# Patient Record
Sex: MALE | Employment: FULL TIME | URBAN - METROPOLITAN AREA
[De-identification: names, ages, dates, MRNs, and addresses within clinical notes are randomized per-mention and may not be internally consistent; named-entity substitution may affect disease eponyms.]

---

## 2020-04-22 ENCOUNTER — HOSPITAL ENCOUNTER (OUTPATIENT)
Age: 70
Setting detail: OBSERVATION
Discharge: HOME | End: 2020-04-23
Attending: EMERGENCY MEDICINE | Admitting: INTERNAL MEDICINE
Payer: COMMERCIAL

## 2020-04-22 ENCOUNTER — APPOINTMENT (OUTPATIENT)
Dept: CT IMAGING | Age: 70
End: 2020-04-22
Attending: EMERGENCY MEDICINE
Payer: COMMERCIAL

## 2020-04-22 ENCOUNTER — APPOINTMENT (OUTPATIENT)
Dept: NON INVASIVE DIAGNOSTICS | Age: 70
End: 2020-04-22
Attending: INTERNAL MEDICINE
Payer: COMMERCIAL

## 2020-04-22 ENCOUNTER — HOSPITAL ENCOUNTER (OUTPATIENT)
Dept: MRI IMAGING | Age: 70
Discharge: HOME | End: 2020-04-22
Attending: INTERNAL MEDICINE
Payer: COMMERCIAL

## 2020-04-22 DIAGNOSIS — M48.061 SPINAL STENOSIS OF LUMBAR REGION, UNSPECIFIED WHETHER NEUROGENIC CLAUDICATION PRESENT: ICD-10-CM

## 2020-04-22 DIAGNOSIS — R29.898 TRANSIENT LEFT LEG WEAKNESS: ICD-10-CM

## 2020-04-22 DIAGNOSIS — R20.2 NUMBNESS AND TINGLING OF LEFT LEG: ICD-10-CM

## 2020-04-22 DIAGNOSIS — R20.0 NUMBNESS AND TINGLING OF LEFT LEG: ICD-10-CM

## 2020-04-22 DIAGNOSIS — R09.89 SUSPECTED STROKE PATIENT LAST KNOWN TO BE WELL MORE THAN 2 HOURS AGO: Primary | ICD-10-CM

## 2020-04-22 PROBLEM — R07.9 CHEST PAIN: Status: ACTIVE | Noted: 2020-04-22

## 2020-04-22 PROBLEM — G45.9 TIA (TRANSIENT ISCHEMIC ATTACK): Status: RESOLVED | Noted: 2020-04-22 | Resolved: 2020-04-22

## 2020-04-22 PROBLEM — I63.9 CVA (CEREBRAL VASCULAR ACCIDENT) (HCC): Status: ACTIVE | Noted: 2020-04-22

## 2020-04-22 PROBLEM — G45.9 TIA (TRANSIENT ISCHEMIC ATTACK): Status: ACTIVE | Noted: 2020-04-22

## 2020-04-22 PROBLEM — E78.5 HYPERLIPIDEMIA: Status: ACTIVE | Noted: 2020-04-22

## 2020-04-22 PROBLEM — C61 PROSTATE CANCER (HCC): Status: ACTIVE | Noted: 2020-04-22

## 2020-04-22 LAB
ALBUMIN SERPL-MCNC: 3.9 G/DL (ref 3.5–5)
ALBUMIN/GLOB SERPL: 1.4 {RATIO} (ref 1.1–2.2)
ALP SERPL-CCNC: 59 U/L (ref 45–117)
ALT SERPL-CCNC: 27 U/L (ref 12–78)
ANION GAP SERPL CALC-SCNC: 11 MMOL/L (ref 5–15)
AST SERPL-CCNC: 17 U/L (ref 15–37)
AV VELOCITY RATIO: 0.95
BASOPHILS # BLD: 0 K/UL (ref 0–0.1)
BASOPHILS NFR BLD: 1 % (ref 0–1)
BILIRUB SERPL-MCNC: 0.6 MG/DL (ref 0.2–1)
BUN SERPL-MCNC: 21 MG/DL (ref 6–20)
BUN/CREAT SERPL: 17 (ref 12–20)
CALCIUM SERPL-MCNC: 9.1 MG/DL (ref 8.5–10.1)
CHLORIDE SERPL-SCNC: 107 MMOL/L (ref 97–108)
CO2 SERPL-SCNC: 27 MMOL/L (ref 21–32)
CREAT SERPL-MCNC: 1.24 MG/DL (ref 0.7–1.3)
DIFFERENTIAL METHOD BLD: ABNORMAL
ECHO AO ROOT DIAM: 3.5 CM
ECHO AV AREA PEAK VELOCITY: 3.2 CM2
ECHO AV AREA/BSA PEAK VELOCITY: 1.5 CM2/M2
ECHO AV PEAK GRADIENT: 5.9 MMHG
ECHO AV PEAK VELOCITY: 121.14 CM/S
ECHO EST RA PRESSURE: 3 MMHG
ECHO LA VOL 2C: 70.78 ML (ref 18–58)
ECHO LA VOL 4C: 42.34 ML (ref 18–58)
ECHO LA VOL BP: 57.12 ML (ref 18–58)
ECHO LA VOL/BSA BIPLANE: 27.37 ML/M2 (ref 16–28)
ECHO LA VOLUME INDEX A2C: 33.91 ML/M2 (ref 16–28)
ECHO LA VOLUME INDEX A4C: 20.29 ML/M2 (ref 16–28)
ECHO LVOT DIAM: 2.06 CM
ECHO LVOT PEAK GRADIENT: 5.3 MMHG
ECHO LVOT PEAK VELOCITY: 114.63 CM/S
ECHO MV A VELOCITY: 50.38 CM/S
ECHO MV E DECELERATION TIME (DT): 179.4 MS
ECHO MV E VELOCITY: 62.82 CM/S
ECHO MV E/A RATIO: 1.25
ECHO PV MAX VELOCITY: 84.59 CM/S
ECHO PV PEAK GRADIENT: 2.9 MMHG
ECHO RV INTERNAL DIMENSION: 3.86 CM
EOSINOPHIL # BLD: 0.1 K/UL (ref 0–0.4)
EOSINOPHIL NFR BLD: 1 % (ref 0–7)
ERYTHROCYTE [DISTWIDTH] IN BLOOD BY AUTOMATED COUNT: 12.6 % (ref 11.5–14.5)
GLOBULIN SER CALC-MCNC: 2.8 G/DL (ref 2–4)
GLUCOSE BLD STRIP.AUTO-MCNC: 146 MG/DL (ref 65–100)
GLUCOSE SERPL-MCNC: 112 MG/DL (ref 65–100)
HCT VFR BLD AUTO: 44 % (ref 36.6–50.3)
HGB BLD-MCNC: 14.3 G/DL (ref 12.1–17)
IMM GRANULOCYTES # BLD AUTO: 0 K/UL (ref 0–0.04)
IMM GRANULOCYTES NFR BLD AUTO: 1 % (ref 0–0.5)
INR PPP: 1 (ref 0.9–1.1)
LYMPHOCYTES # BLD: 1 K/UL (ref 0.8–3.5)
LYMPHOCYTES NFR BLD: 23 % (ref 12–49)
MCH RBC QN AUTO: 29.7 PG (ref 26–34)
MCHC RBC AUTO-ENTMCNC: 32.5 G/DL (ref 30–36.5)
MCV RBC AUTO: 91.3 FL (ref 80–99)
MONOCYTES # BLD: 0.4 K/UL (ref 0–1)
MONOCYTES NFR BLD: 8 % (ref 5–13)
MV DEC SLOPE: 3.5
NEUTS SEG # BLD: 3 K/UL (ref 1.8–8)
NEUTS SEG NFR BLD: 67 % (ref 32–75)
NRBC # BLD: 0 K/UL (ref 0–0.01)
NRBC BLD-RTO: 0 PER 100 WBC
PLATELET # BLD AUTO: 194 K/UL (ref 150–400)
PMV BLD AUTO: 10.6 FL (ref 8.9–12.9)
POTASSIUM SERPL-SCNC: 4.4 MMOL/L (ref 3.5–5.1)
PROT SERPL-MCNC: 6.7 G/DL (ref 6.4–8.2)
PROTHROMBIN TIME: 10.2 SEC (ref 9–11.1)
RBC # BLD AUTO: 4.82 M/UL (ref 4.1–5.7)
SERVICE CMNT-IMP: ABNORMAL
SODIUM SERPL-SCNC: 145 MMOL/L (ref 136–145)
WBC # BLD AUTO: 4.4 K/UL (ref 4.1–11.1)

## 2020-04-22 PROCEDURE — 93306 TTE W/DOPPLER COMPLETE: CPT

## 2020-04-22 PROCEDURE — 85025 COMPLETE CBC W/AUTO DIFF WBC: CPT

## 2020-04-22 PROCEDURE — 70551 MRI BRAIN STEM W/O DYE: CPT

## 2020-04-22 PROCEDURE — 85610 PROTHROMBIN TIME: CPT

## 2020-04-22 PROCEDURE — 74011636320 HC RX REV CODE- 636/320: Performed by: EMERGENCY MEDICINE

## 2020-04-22 PROCEDURE — 93005 ELECTROCARDIOGRAM TRACING: CPT

## 2020-04-22 PROCEDURE — 74011250637 HC RX REV CODE- 250/637: Performed by: EMERGENCY MEDICINE

## 2020-04-22 PROCEDURE — 94762 N-INVAS EAR/PLS OXIMTRY CONT: CPT

## 2020-04-22 PROCEDURE — 65270000029 HC RM PRIVATE

## 2020-04-22 PROCEDURE — 36415 COLL VENOUS BLD VENIPUNCTURE: CPT

## 2020-04-22 PROCEDURE — 99218 HC RM OBSERVATION: CPT

## 2020-04-22 PROCEDURE — 70496 CT ANGIOGRAPHY HEAD: CPT

## 2020-04-22 PROCEDURE — 65660000000 HC RM CCU STEPDOWN

## 2020-04-22 PROCEDURE — 72131 CT LUMBAR SPINE W/O DYE: CPT

## 2020-04-22 PROCEDURE — 96372 THER/PROPH/DIAG INJ SC/IM: CPT

## 2020-04-22 PROCEDURE — 99285 EMERGENCY DEPT VISIT HI MDM: CPT

## 2020-04-22 PROCEDURE — 74011250636 HC RX REV CODE- 250/636: Performed by: INTERNAL MEDICINE

## 2020-04-22 PROCEDURE — 80053 COMPREHEN METABOLIC PANEL: CPT

## 2020-04-22 PROCEDURE — 70450 CT HEAD/BRAIN W/O DYE: CPT

## 2020-04-22 PROCEDURE — 82962 GLUCOSE BLOOD TEST: CPT

## 2020-04-22 PROCEDURE — 74011250636 HC RX REV CODE- 250/636: Performed by: EMERGENCY MEDICINE

## 2020-04-22 RX ORDER — ASPIRIN 325 MG
325 TABLET ORAL DAILY
Status: DISCONTINUED | OUTPATIENT
Start: 2020-04-23 | End: 2020-04-22

## 2020-04-22 RX ORDER — SODIUM CHLORIDE 0.9 % (FLUSH) 0.9 %
5-40 SYRINGE (ML) INJECTION EVERY 8 HOURS
Status: DISCONTINUED | OUTPATIENT
Start: 2020-04-22 | End: 2020-04-23 | Stop reason: HOSPADM

## 2020-04-22 RX ORDER — ACETAMINOPHEN 650 MG/1
650 SUPPOSITORY RECTAL
Status: DISCONTINUED | OUTPATIENT
Start: 2020-04-22 | End: 2020-04-23 | Stop reason: HOSPADM

## 2020-04-22 RX ORDER — ENOXAPARIN SODIUM 100 MG/ML
40 INJECTION SUBCUTANEOUS EVERY 24 HOURS
Status: DISCONTINUED | OUTPATIENT
Start: 2020-04-22 | End: 2020-04-22

## 2020-04-22 RX ORDER — SODIUM CHLORIDE 0.9 % (FLUSH) 0.9 %
5-40 SYRINGE (ML) INJECTION AS NEEDED
Status: DISCONTINUED | OUTPATIENT
Start: 2020-04-22 | End: 2020-04-23 | Stop reason: HOSPADM

## 2020-04-22 RX ORDER — GUAIFENESIN 100 MG/5ML
81 LIQUID (ML) ORAL DAILY
Status: DISCONTINUED | OUTPATIENT
Start: 2020-04-23 | End: 2020-04-22

## 2020-04-22 RX ORDER — CLOPIDOGREL BISULFATE 75 MG/1
75 TABLET ORAL
Status: COMPLETED | OUTPATIENT
Start: 2020-04-22 | End: 2020-04-22

## 2020-04-22 RX ORDER — ACETAMINOPHEN 325 MG/1
650 TABLET ORAL
Status: DISCONTINUED | OUTPATIENT
Start: 2020-04-22 | End: 2020-04-23 | Stop reason: HOSPADM

## 2020-04-22 RX ORDER — CLOPIDOGREL BISULFATE 75 MG/1
75 TABLET ORAL DAILY
Status: DISCONTINUED | OUTPATIENT
Start: 2020-04-23 | End: 2020-04-23 | Stop reason: HOSPADM

## 2020-04-22 RX ORDER — GUAIFENESIN 100 MG/5ML
81 LIQUID (ML) ORAL DAILY
Status: DISCONTINUED | OUTPATIENT
Start: 2020-04-23 | End: 2020-04-23 | Stop reason: HOSPADM

## 2020-04-22 RX ORDER — GUAIFENESIN 100 MG/5ML
81 LIQUID (ML) ORAL DAILY
COMMUNITY

## 2020-04-22 RX ORDER — ENOXAPARIN SODIUM 100 MG/ML
40 INJECTION SUBCUTANEOUS EVERY 24 HOURS
Status: DISCONTINUED | OUTPATIENT
Start: 2020-04-22 | End: 2020-04-23 | Stop reason: HOSPADM

## 2020-04-22 RX ORDER — ASPIRIN 325 MG
325 TABLET ORAL
Status: COMPLETED | OUTPATIENT
Start: 2020-04-22 | End: 2020-04-22

## 2020-04-22 RX ORDER — FENOFIBRATE 160 MG/1
160 TABLET ORAL DAILY
COMMUNITY

## 2020-04-22 RX ORDER — TADALAFIL 5 MG/1
5 TABLET ORAL
COMMUNITY

## 2020-04-22 RX ORDER — ATORVASTATIN CALCIUM 20 MG/1
40 TABLET, FILM COATED ORAL DAILY
Status: DISCONTINUED | OUTPATIENT
Start: 2020-04-23 | End: 2020-04-23 | Stop reason: HOSPADM

## 2020-04-22 RX ADMIN — CLOPIDOGREL BISULFATE 75 MG: 75 TABLET ORAL at 11:16

## 2020-04-22 RX ADMIN — Medication 10 ML: at 21:36

## 2020-04-22 RX ADMIN — IOPAMIDOL 100 ML: 755 INJECTION, SOLUTION INTRAVENOUS at 10:29

## 2020-04-22 RX ADMIN — Medication 20 ML: at 14:00

## 2020-04-22 RX ADMIN — ENOXAPARIN SODIUM 40 MG: 40 INJECTION SUBCUTANEOUS at 21:35

## 2020-04-22 RX ADMIN — SODIUM CHLORIDE 1000 ML: 900 INJECTION, SOLUTION INTRAVENOUS at 11:16

## 2020-04-22 RX ADMIN — ASPIRIN 325 MG: 325 TABLET ORAL at 11:16

## 2020-04-22 NOTE — ED NOTES
TRANSFER - OUT REPORT:    Verbal report given to Marbella Agrawal RN(name) on [facility]  [facility] for routine [facility] of care       Report consisted of patients Situation, Background, Assessment and   Recommendations(SBAR). Information from the following report(s) SBAR, ED Summary and MAR and test results was reviewed with the receiving nurse. Lines:   Peripheral IV 04/22/20 Left Antecubital (Active)   Site Assessment Clean, dry, & intact 4/22/2020 10:45 AM   Phlebitis Assessment 0 4/22/2020 10:45 AM   Infiltration Assessment 0 4/22/2020 10:45 AM   Dressing Status Clean, dry, & intact 4/22/2020 10:45 AM   Dressing Type Transparent 4/22/2020 10:45 AM   Hub Color/Line Status Pink 4/22/2020 10:45 AM   Action Taken Dressing reinforced 4/22/2020 10:45 AM   Alcohol Cap Used Yes 4/22/2020 10:45 AM        Opportunity for questions and clarification was provided.       Patient transported with:   iv saline lock

## 2020-04-22 NOTE — CONSULTS
[facility]: [id number] [address] Neurology  [place] [address]  [phone]      Name:   Sesar Arellano   Medical record #: [id number]  Admission Date: 4/22/2020     Who Consulted: Dr. Tay Levine    Reason for Consult:  Eval and treat TIA    HISTORY OF PRESENT ILLNESS:     This is a 77 y.o. male who is admitted for leg weakness. Mr. Reina Nelson presented to the ED with left leg weakness and tingling. He woke up to use the restroom overnight and noted that he had to exert more effort to lift his leg off the bed, he was able to ambulate the short distance to the bathroom where he urinated without difficulty. However on return he felt like his leg was weaker than normal, and numb, and he felt his left leg give out from his weight and caused him to fall backwards, landing on his rear end in his bedroom. He was then able to get up and went back to bed. He then awoke again around in the early am and noted persistent left lower extremity numbness and weakness that reportedly gradually resolved. He reported to the ED after his symptoms continued to return intermittently for a few seconds when he walked. Upon admission to the ED he was noted to have tenderness in his left lumbar area. At lumbar CT showed moderate to severe canal stenosis at multiple levels. The Neurology Service is asked to evaluate for stroke. Upon interview he reported that he has been having some numbness in his left calf for the last few months. Since admission he has had a few additional episodes that occur when he stands. Neuro-imaging:     CT Head: No acute intracranial bleed. 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 CTA NECK  No pulmonary mass or nodule. No pneumothorax.  The bilateral subclavian, common carotid, and internal carotid arteries are patent with no flow-limiting stenosis.     % of right carotid artery stenosis: 0  % of left carotid artery stenosis: 0     NASCET method was utilized for calculating stenosis.      Multilevel canal and foraminal stenosis of the cervical spine. Severe foraminal stenoses. The cervical soft tissues are unremarkable. There are degenerative changes of the cervical spine.     CTA HEAD  The vertebral basilar system is diminutive in size. M1 segments are patent. M2 segments demonstrate symmetric arborization. Azygous A2. Dural venous sinuses are patent. Petrous and cavernous ICAs are patent. Posterior communicating artery on the right. P1 and P2 segments are patent. . The basilar artery and its branches are normal. The internal carotid, anterior cerebral, and middle cerebral arteries are patent. There is no flow-limiting intracranial stenosis. There is no aneurysm. There are no sizable posterior communicating arteries.     IMPRESSION:   No aneurysm, dissection or or evidence of hemodynamically significant stenosis. No acute intracranial process identified. No major vessel occlusion. EKG: normal sinus rhythm. Care Plan discussed with:  Patient x   Family    RN    Care Manager    Consultant/Specialist:         Thank you for allowing the Neurology Service the pleasure of participating in the care of your patient. This patient will be discussed with my collaborating care team physician,  Dr. Abbie Guzmán, and he may have further recommendations regarding this patient's care      Fabienne Lancaster, [facility]  ====================      Attending Attestation:       NEUROLOGY NOTE ADDENDUM:    4/23/2020      I have reviewed the documentation provided by the nurse practitioner, discussed her findings, clinical impression, and the proposed management plans with regards to this patient's encounter. I have personally performed a face to face diagnostic evaluation on this patient. My findings are as follows:      Mac Ma is a 77 y.o. male who  has a past medical history of Cancer ([facility] [address]  who presents for evaluation of L lower extremity weakness. (+) lower back pain.      Exam:  Visit Vitals  /63 (BP 1 Location: Left arm, BP Patient Position: At rest)   Pulse (!) 48   Temp 98.1 °F (36.7 °C)   Resp 18   Ht 5' 10\" (1.778 m)   Wt 90.7 kg (199 lb 15.3 oz)   SpO2 96%   BMI 28.69 kg/m²     Gen: Awake, alert, follows commands  Appropriate appearance, normal speech. Oriented to all spheres. No visual field defect on confrontation exam.  Full eyes movement, with no nystagmus, no diplopia, no ptosis. Normal gag and swallow. All remaining cranial nerves were normal  Motor function: 5/5 in all extremities  Sensory: intact to LT, PP and JPS  DTRs + in all extremities, (-) Babinski  Good FTN and HTS   Gait: Deferred      Assessment  Intermittent L lower leg weakness, more likely related to mod to severe L2L3 spinal stenosis  MRI brain negative for acute stroke  CT head reported possible stroke, I think is more artifactual in nature due to asymmetric imaging    Plan  1) Needs neurosurgical evaluation which can be done as out patient  2) Physical therapy        Thank you for the consultation. Endy Okeefe MD  Diplomate, American Board of Psychiatry and Neurology  Diplomate, Neuromuscular Medicine  Diplomate, American Board of Electrodiagnostic Medicine                       Assessment/Plan:     1. LLE weakness:    · Continue home ASA 81 mg  · Neurochecks:  Every 4 hours  · Outpt follow up with neurosurgery for lumbar stenosis    Stroke risk stratification:    · A1C:  · LDL:    · TTE:  Estimated left ventricular ejection fraction is 55 - 60%. No regional wall motion abnormality noted. · MRI:  No evidence of intracranial mass, hemorrhage or acute infarction. 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 Mobility:   · Has been OOB. · [facility] to [facility]    3.   Diet: · Does not need SLP, passed STAND    4. VTE Prophylaxes:   · Per Primary team           Review of Systems: 10 point ROS was performed. Pertinent positives listed in HPI. Negative ROS is as follows. Pt denies: angina, palpitations, vision loss, slurred speech, aphasia, confusion, fever, chills, falls, headache, diplopia, back pain, neck pain, prior episodes of vertigo, hallucinations, new medications or dosage changes. Physical Exam    General:   Alert, cooperative, no acute distress. Lungs:   Clear to auscultation bilaterally. No crackles/wheezes. Heart:  Abdominal:  Normal rhythm, no carotid bruits, no peripheral edema  Soft and nondistended   NEUROLOGICAL EXAM:     Mental Status: Oriented to time, place and person. Fully attentive. No aphasia. Full fund of knowledge. Normal recent and remote memory. Cranial Nerves:   Visual Fields:  normal in all quadrants in both eyes. EOM: no nystagmus. Facial movements:  symmetric, no ptosis Facial sensation:  intact to LT on both sides. Hearing:  normal.       Language:  no dysarthria, no aphasia, normal fluency, normal repetition. Tongue: midline. Soft palate: not examined  SCMs: normal, symmetric. Reflexes:   LUExt: 2+/ 4                 RUExt: 2+/ 4  LLExt: 2+/4                  RLExt: 2+/ 4          Sensory:   LT and Temp intact in all extremities            Cerebellar:  No resting, no postural tremors, normal finger nose finger. No pronator drift                            Motor:           LUExt: 5/ 5               RUExt: 5/ 5                                              LLExt: 5/ 5                RLExt: 5/ 5        Gait:   Not tested              Allergies:   No Known Allergies    Outpatient Meds  No current facility-administered medications on file prior to encounter. Current Outpatient Medications on File Prior to Encounter   Medication Sig Dispense Refill    aspirin 81 mg chewable tablet Take 81 mg by mouth daily.       fenofibrate (LOFIBRA) 160 mg tablet Take 160 mg by mouth daily.  tadalafiL (Cialis) 5 mg tablet Take 5 mg by mouth. Inpatient Meds    Current Facility-Administered Medications   Medication Dose Route Frequency Provider Last Rate Last Dose    sodium chloride (NS) flush 5-40 mL  5-40 mL IntraVENous Q8H Abran Silver MD   20 mL at 04/22/20 1400    sodium chloride (NS) flush 5-40 mL  5-40 mL IntraVENous PRN Tamica Silver MD        enoxaparin (LOVENOX) injection 40 mg  40 mg SubCUTAneous Q24H Tamica Silver MD        acetaminophen (TYLENOL) tablet 650 mg  650 mg Oral Q4H PRN Tamica Silver MD        Or   Mahin Losevinod acetaminophen (TYLENOL) solution 650 mg  650 mg Per NG tube Q4H PRN Tamica Silver MD        Or   Mahin Losevinod acetaminophen (TYLENOL) suppository 650 mg  650 mg Rectal Q4H PRN Haider Hernandez MD        [START ON 4/23/2020] atorvastatin (LIPITOR) tablet 40 mg  40 mg Oral DAILY Tamica Silver MD        [START ON 4/23/2020] aspirin tablet 325 mg  325 mg Oral DAILY Tamica Silver MD               Past Medical History:   Diagnosis Date    Cancer [facility])        History reviewed. No pertinent surgical history. family history is not on file. reports that he has never smoked. He has never used smokeless tobacco. He reports current alcohol use.            Lab Results (last 24 hrs)  Recent Results (from the past 24 hour(s))   GLUCOSE, POC    Collection Time: 04/22/20 10:02 AM   Result Value Ref Range    Glucose (POC) 146 (H) 65 - 100 mg/dL    Performed by Mauro Theodore    CBC WITH AUTOMATED DIFF    Collection Time: 04/22/20 10:41 AM   Result Value Ref Range    WBC 4.4 4.1 - 11.1 K/uL    RBC 4.82 4.10 - 5.70 M/uL    HGB 14.3 12.1 - 17.0 g/dL    HCT 44.0 36.6 - 50.3 %    MCV 91.3 80.0 - 99.0 FL    MCH 29.7 26.0 - 34.0 PG    MCHC 32.5 30.0 - 36.5 g/dL    RDW 12.6 11.5 - 14.5 %    PLATELET 230 936 - 196 K/uL    MPV 10.6 8.9 - 12.9 FL    NRBC 0.0 0.0  WBC    ABSOLUTE NRBC 0.00 0.00 - 0.01 K/uL    NEUTROPHILS 67 32 - 75 %    LYMPHOCYTES 23 12 - 49 %    MONOCYTES 8 5 - 13 %    EOSINOPHILS 1 0 - 7 %    BASOPHILS 1 0 - 1 %    IMMATURE GRANULOCYTES 1 (H) 0 - 0.5 %    ABS. NEUTROPHILS 3.0 1.8 - 8.0 K/UL    ABS. LYMPHOCYTES 1.0 0.8 - 3.5 K/UL    ABS. MONOCYTES 0.4 0.0 - 1.0 K/UL    ABS. EOSINOPHILS 0.1 0.0 - 0.4 K/UL    ABS. BASOPHILS 0.0 0.0 - 0.1 K/UL    ABS. IMM. GRANS. 0.0 0.00 - 0.04 K/UL    DF AUTOMATED     METABOLIC PANEL, COMPREHENSIVE    Collection Time: 04/22/20 10:41 AM   Result Value Ref Range    Sodium 145 136 - 145 mmol/L    Potassium 4.4 3.5 - 5.1 mmol/L    Chloride 107 97 - 108 mmol/L    CO2 27 21 - 32 mmol/L    Anion gap 11 5 - 15 mmol/L    Glucose 112 (H) 65 - 100 mg/dL    BUN 21 (H) 6 - 20 MG/DL    Creatinine 1.24 0.70 - 1.30 MG/DL    BUN/Creatinine ratio 17 12 - 20      GFR est AA >60 >60 ml/min/1.73m2    GFR est non-AA 58 (L) >60 ml/min/1.73m2    Calcium 9.1 8.5 - 10.1 MG/DL    Bilirubin, total 0.6 0.2 - 1.0 MG/DL    ALT (SGPT) 27 12 - 78 U/L    AST (SGOT) 17 15 - 37 U/L    Alk.  phosphatase 59 45 - 117 U/L    Protein, total 6.7 6.4 - 8.2 g/dL    Albumin 3.9 3.5 - 5.0 g/dL    Globulin 2.8 2.0 - 4.0 g/dL    A-G Ratio 1.4 1.1 - 2.2     PROTHROMBIN TIME + INR    Collection Time: 04/22/20 10:41 AM   Result Value Ref Range    INR 1.0 0.9 - 1.1      Prothrombin time 10.2 9.0 - 11.1 sec   EKG, 12 LEAD, INITIAL    Collection Time: 04/22/20 10:54 AM   Result Value Ref Range    Ventricular Rate 57 BPM    Atrial Rate 57 BPM    P-R Interval 180 ms    QRS Duration 70 ms    Q-T Interval 430 ms    QTC Calculation (Bezet) 418 ms    Calculated P Axis -10 degrees    Calculated R Axis -7 degrees    Calculated T Axis 2 degrees    Diagnosis       Sinus bradycardia  Minimal voltage criteria for LVH, may be normal variant  Borderline ECG  No previous ECGs available     ECHO ADULT COMPLETE    Collection Time: 04/22/20  2:56 PM   Result Value Ref Range    LA Volume 57.12 18 - 58 mL    Ao Root D 3.50 cm    Aortic Valve Systolic Peak Velocity 107.04 cm/s    Aortic Valve Area by Continuity of Peak Velocity 3.2 cm2    AoV PG 5.9 mmHg    LVOT d 2.06 cm    LVOT Peak Velocity 114.63 cm/s    LVOT Peak Gradient 5.3 mmHg    MV A Tim 50.38 cm/s    MV E Tim 62.82 cm/s    MV E/A 1.25     RVIDd 3.86 cm    LA Vol 4C 42.34 18 - 58 mL    LA Vol 2C 70.78 (A) 18 - 58 mL    Est. RA Pressure 3.0 mmHg    Mitral Valve E Wave Deceleration Time 179.4 ms    Pulmonic Valve Max Velocity 84.59 cm/s    LA Vol Index 27.37 16 - 28 ml/m2    LA Vol Index 33.91 16 - 28 ml/m2    LA Vol Index 20.29 16 - 28 ml/m2    ADAN/BSA Pk Tim 1.5 cm2/m2    Mitral Valve Deceleration [place] [id number]     AV Velocity Ratio 0.95     PV peak gradient 2.9 mmHg

## 2020-04-22 NOTE — ED NOTES
Patient notified to remain NPO until further notice from provider, all questions answered and patient confirmed understanding.

## 2020-04-22 NOTE — ED PROVIDER NOTES
77 y.o. male with history of HLD, and minor prostate cancer being observed, on 81mg aspirin, presents to the ED stating that this AM he awoke with left leg numbness and tingling with associated left leg weakness at 0430 when he awoke to use to the bathroom. He states that he had to exert more effort to lift his leg off the bed and he was able to ambulate the short distance to the bathroom where he urinated without difficulty, but on return he felt like his leg was weaker than normal, and numb, and he felt his left leg give out from his weight and caused him to fall backwards, landing on his rear end in his bedroom. He was then able to get up and went back to bed. He then awoke again around 6:30am when he noted persistent left lower extremity numbness and weakness that reportedly gradually resolved. However, his symptoms have continued to return intermittently as he notes them when he walks intermittently. He states that the returned symptoms only seem to last for a few seconds and then resolve. He denies any symptoms currently, was seen to ambulate without difficulty into the ED. He denies any history of prior CVA, or sciatica sx. No history of similar symptoms like this. He took his normal ASA this AM, no other medications for his sx. Past Medical History:   Diagnosis Date    Cancer [facility])        History reviewed. No pertinent surgical history. History reviewed. No pertinent family history.     Social History     Socioeconomic History    Marital status:      Spouse name: Not on file    Number of children: Not on file    Years of education: Not on file    Highest education level: Not on file   Occupational History    Not on file   Social Needs    Financial resource strain: Not on file    Food insecurity     Worry: Not on file     Inability: Not on file    Transportation needs     Medical: Not on file     Non-medical: Not on file   Tobacco Use    Smoking status: Never Smoker    Smokeless tobacco: Never Used   Substance and Sexual Activity    Alcohol use: Yes    Drug use: Not on file    Sexual activity: Not on file   Lifestyle    Physical activity     Days per week: Not on file     Minutes per session: Not on file    Stress: Not on file   Relationships    Social connections     Talks on phone: Not on file     Gets together: Not on file     Attends Hindu service: Not on file     Active member of club or organization: Not on file     Attends meetings of clubs or organizations: Not on file     Relationship status: Not on file    Intimate partner violence     Fear of current or ex partner: Not on file     Emotionally abused: Not on file     Physically abused: Not on file     Forced sexual activity: Not on file   Other Topics Concern    Not on file   Social History Narrative    Not on file         ALLERGIES: Patient has no known allergies. Review of Systems   Constitutional: Positive for activity change. Negative for appetite change, chills and fever. HENT: Negative for congestion, rhinorrhea, sinus pain, sneezing and sore throat. Eyes: Negative for photophobia and visual disturbance. Respiratory: Negative for cough and shortness of breath. Cardiovascular: Negative for chest pain. Gastrointestinal: Negative for abdominal pain, blood in stool, constipation, diarrhea, nausea and vomiting. Genitourinary: Negative for difficulty urinating, dysuria, flank pain, hematuria, penile pain and testicular pain. Musculoskeletal: Negative for arthralgias, back pain, myalgias and neck pain. Skin: Negative for rash and wound. Neurological: Positive for weakness and numbness. Negative for syncope, light-headedness and headaches. Psychiatric/Behavioral: Negative for self-injury and suicidal ideas. All other systems reviewed and are negative.       Vitals:    04/22/20 0946 04/22/20 0953   BP: 123/73    Pulse: 62    Resp: 16    Temp: 98.5 °F (36.9 °C)    SpO2: 97%    Weight: 90.7 kg (200 lb)   Height:  5' 10\" (1.778 m)            Physical Exam  Vitals signs and nursing note reviewed. Constitutional:       General: He is not in acute distress. Appearance: Normal appearance. He is well-developed. He is not diaphoretic. HENT:      Head: Normocephalic and atraumatic. Nose: Nose normal.   Eyes:      Extraocular Movements: Extraocular movements intact. Conjunctiva/sclera: Conjunctivae normal.      Pupils: Pupils are equal, round, and reactive to light. Neck:      Musculoskeletal: Neck supple. Cardiovascular:      Rate and Rhythm: Normal rate and regular rhythm. Heart sounds: Normal heart sounds. Pulmonary:      Effort: Pulmonary effort is normal.      Breath sounds: Normal breath sounds. Abdominal:      General: There is no distension. Palpations: Abdomen is soft. Tenderness: There is no abdominal tenderness. Musculoskeletal:      Lumbar back: He exhibits tenderness. He exhibits no bony tenderness, no swelling, no deformity, no spasm and normal pulse. Back:       Comments: Negative SLR b/l. Skin:     General: Skin is warm and dry. Neurological:      General: No focal deficit present. Mental Status: He is alert and oriented to person, place, and time. Cranial Nerves: No cranial nerve deficit. Sensory: No sensory deficit. Motor: No weakness. Coordination: Coordination normal.      Comments: Intact sensation, 5/5 strength in all 4 extremities, intact finger to nose, neg pronator drift, fluent speech, CN intact. No saddle anesthesia. MDM    77 y.o. male awoke with left leg weakness and numbness that then reportedly resolved, but then seems to return intermittently when ambulating. On exam he is neuro intact, as above. Concern for CVA, vs radiculopathy/sciatica, but no associated pain, non-reproducible on exam with SLR, but very mild left lower back muscle tenderness.  No evidence of trauma or midline tenderness. Code-s level 2 was called. CT WO and CTA head and neck were ordered per protocol and CT Lumbar was also done to further evaluate for bony abnormality given possibility of peripheral nerve issue. CT head WO shows no acute bleed or infarct, but does show age-indeterminate left external capsule infarct. CTA head and neck negative for acute abnormality    CT lumbar shows moderate to severe canal stenosis at multiple levels. Labs returned showing no significant abnormalities. Possible peripheral radiculopathy/sciatica, vs CVA/TIA. May only be noting sx while walking because it is causing his sx or because he only notices the deficits when walking. Procedures    10:10 AM discussed case with Dr. Jj Odom, tele-neurology, who agreed to see the patient at the bedside. 1054 EKG shows sinus bradycardia with a rate of 57 bpm with no acute ST or T wave abnormalities suggestive of ischemia. 11 AM further discussed case with Dr. Jj Odom, who recommends giving IV fluid bolus, full-strength aspirin, Plavix, and recommends admission for CVA work-up. Marineist Ehsan Serve for Admission  11:24 AM    ED Room Number: WER03/03  Patient Name and age:  Adair Treviño 77 y.o.  male  Working Diagnosis:   1. Suspected stroke patient last known to be well more than 2 hours ago    2. Numbness and tingling of left leg    3. Transient left leg weakness    4. Spinal stenosis of lumbar region, unspecified whether neurogenic claudication present        COVID-19 Suspicion:  no    Readmission: no  Isolation Requirements:  no  Recommended Level of Care:  med/surg  Department:[facility] - [phone]  Other:  Left lower extremity weakness and numbness since this AM. Waxing and waning, sx since with walking, possible CVA vs peripheral nerve issue from low back arthritis.  teleneuro rec'd admission for CVA w/u

## 2020-04-22 NOTE — PROGRESS NOTES
Reason for Admission:   TIA                   RUR Score:        11%             Plan for utilizing home health:    No prior needs for home health                      Current Advanced Directive/Advance Care Plan: DNR, No advance care                         Transition of Care Plan:  I met with the patient and explained my role as . Patient stated he lives with his wife Mayelin Shelley home [phone]. He is independent on all ADL's and drives. Patient has prescription coverage and gets his prescriptions filled at [facility]. No DME. PCP is Dr. Jose Zheng with [facility]. Plan:  1. Continue to monitor patients response to medical treatment . 2. Family will transport at discharge  3. No needs currently. 4. Case management to follow.   EMERSON Ku

## 2020-04-22 NOTE — ED NOTES
TRANSFER - OUT REPORT:    Verbal report given to JACE Medic(name) on Rupesh Pac  being transferred to [facility] Rm530(unit) for routine progression of care       Report consisted of patients Situation, Background, Assessment and   Recommendations(SBAR). Information from the following report(s) SBAR, ED Summary and MAR and test resultswas reviewed with the receiving nurse. Lines:   Peripheral IV 04/22/20 Left Antecubital (Active)   Site Assessment Clean, dry, & intact 4/22/2020 10:45 AM   Phlebitis Assessment 0 4/22/2020 10:45 AM   Infiltration Assessment 0 4/22/2020 10:45 AM   Dressing Status Clean, dry, & intact 4/22/2020 10:45 AM   Dressing Type Transparent 4/22/2020 10:45 AM   Hub Color/Line Status Pink 4/22/2020 10:45 AM   Action Taken Dressing reinforced 4/22/2020 10:45 AM   Alcohol Cap Used Yes 4/22/2020 10:45 AM        Opportunity for questions and clarification was provided.       Patient transported with:   iv mariama TOMLIN

## 2020-04-22 NOTE — PROGRESS NOTES
Bedside and Verbal shift change report given to Vega Gray rn  (oncoming nurse) by Karson Kidd  (offgoing nurse). Report included the following information SBAR and Kardex.

## 2020-04-22 NOTE — ED TRIAGE NOTES
Patient ambulated to treatment area steady gait and complains of numbness and tingling in left leg that he noticed when waking up this morning at 0430. Patient ambulated to bathroom and fell walking back to bed GLF landing on buttom. Denies injury, loc. Patient does take baby aspirin daily and has taken today.

## 2020-04-22 NOTE — ED NOTES
Signs and symptoms: numbness tingling left leg that is intermittent starting at 0430 denies symptoms at this time  VAN score: Negative  Last Known Well: 2300 04/21/20  Blood Glucose (EMS acceptable): 146   Blood Pressure (EMS acceptable): 123/73  Anticoagulants: daily 81mg aspirin taken this morning

## 2020-04-22 NOTE — ED NOTES
Transfer Assessment: Patient A&O x4 and in no distress. Physical re-examination reveals some improvement in pts condition with reassessment of vital signs completed at the time of transfer.

## 2020-04-22 NOTE — PROGRESS NOTES
CMS Note  4/22/2020    Patient received and signed the Observation letter. Patient was given a copy for their record.   Jess Garcia CMS

## 2020-04-22 NOTE — H&P
[facility]  [address], [facility] [address]  [phone]    Admission History and Physical      NAME:  David Michael   :   1950   MRN:  [id number]     PCP:  None     Date/Time:  2020         Subjective:     CHIEF COMPLAINT: leg weakness     HISTORY OF PRESENT ILLNESS:     Mr. Shi Rivero is a 77 y.o. with h/o hld, prostate cancer who presents with leg weakness. Pt awoke with symptoms at around 4:30. It was associated with leg numbness. Symptoms resolved but are somewhat intermittent. No known prior stroke or stroke-like symptoms. Takes a baby aspirin every day. Also reporting an episode of chest pain. Described as left shoulder pain radiating down his arm. No recurrence. No known CAD. Past Medical History:   Diagnosis Date    Cancer [facility])         History reviewed. No pertinent surgical history. Social History     Tobacco Use    Smoking status: Never Smoker    Smokeless tobacco: Never Used   Substance Use Topics    Alcohol use: Yes        Family history  HTN     No Known Allergies     Prior to Admission medications    Medication Sig Start Date End Date Taking? Authorizing Provider   aspirin 81 mg chewable tablet Take 81 mg by mouth daily. Yes Other, MD Constance   fenofibrate (LOFIBRA) 160 mg tablet Take 160 mg by mouth daily. Yes Other, MD Constance   tadalafiL (Cialis) 5 mg tablet Take 5 mg by mouth.    Yes Other, MD Constance         Review of Systems:       Gen:  Eyes:  ENT:  CVS:  Pulm:  GI:    :    MS:  Skin:  Psych:  Endo:    Hem:  Renal:    Neuro:  Numbnessweakness          Objective:      VITALS:    Vital signs reviewed; most recent are:    Visit Vitals  /85   Pulse (!) 47   Temp 98.1 °F (36.7 °C)   Resp 16   Ht 5' 10\" (1.778 m)   Wt 90.7 kg (199 lb 15.3 oz)   SpO2 97%   BMI 28.69 kg/m²     SpO2 Readings from Last 6 Encounters:   20 97%        No intake or output data in the 24 hours ending 20 1526         Exam:     Physical Exam:    Gen:  Well-developed, well-nourished, in no acute distress  HEENT:  Pink conjunctivae, PERRL, hearing intact to voice, moist mucous membranes  Neck:  Supple, without masses, thyroid non-tender  Resp:  No accessory muscle use, clear breath sounds without wheezes rales or rhonchi  Card:  No murmurs, normal S1, S2 without thrills, bruits or peripheral edema  Abd:  Soft, non-tender, non-distended, normoactive bowel sounds are present, no palpable organomegaly  Lymph:  No cervical adenopathy  Musc:  No cyanosis or clubbing  Skin:  No rashes or ulcers, skin turgor is good  Neuro:  Cranial nerves 3-12 are grossly intact,  strength is 5/5 bilaterally, dorsi / plantarflexion strength is 5/5 bilaterally, follows commands appropriately  Psych:  Alert with good insight. Oriented to person, place, and time       Labs:    Recent Labs     04/22/20  1041   WBC 4.4   HGB 14.3   HCT 44.0        Recent Labs     04/22/20  1041      K 4.4      CO2 27   *   BUN 21*   CREA 1.24   CA 9.1   ALB 3.9   SGOT 17   ALT 27     No components found for: GLPOC  No results for input(s): PH, PCO2, PO2, HCO3, FIO2 in the last 72 hours. Recent Labs     04/22/20  1041   INR 1.0         EKG reviewed:   Sinus bradycardia       Assessment/Plan:       Active Problems:    CVA (cerebral vascular accident): age indeterminate left external capsule infarct noted on CT head. Order MRI, echo. Monitor on telemetry. CTA head and neck notes no vessel occlusions. Increase asa to full dose. Send A1c and lipid panel      Hyperlipidemia: resume lipitor. Prostate cancer: outpt follow up      Chest pain: reporting left sided shoulder pain radiating down his arm 1-2 months ago. No recurrence. EKG is non ischemic. Given presence of risk factors will proceed with stress test     HTN; BP mildly elevated. Monitor trend. May need to start treatment.       Surrogate decision maker: wife    Total time spent with patient: 79 Minutes Care Plan discussed with: Patient    Discussed:  Care Plan    Prophylaxis:  Lovenox    Probable Disposition:  Home w/Family           ___________________________________________________    Attending Physician: Meenu Lindsay MD

## 2020-04-23 ENCOUNTER — APPOINTMENT (OUTPATIENT)
Dept: NUCLEAR MEDICINE | Age: 70
End: 2020-04-23
Attending: INTERNAL MEDICINE
Payer: COMMERCIAL

## 2020-04-23 ENCOUNTER — HOSPITAL ENCOUNTER (INPATIENT)
Dept: NON INVASIVE DIAGNOSTICS | Age: 70
Discharge: HOME | End: 2020-04-23
Attending: INTERNAL MEDICINE
Payer: COMMERCIAL

## 2020-04-23 VITALS
RESPIRATION RATE: 18 BRPM | WEIGHT: 199.96 LBS | TEMPERATURE: 98.1 F | OXYGEN SATURATION: 96 % | HEART RATE: 63 BPM | HEIGHT: 70 IN | SYSTOLIC BLOOD PRESSURE: 125 MMHG | DIASTOLIC BLOOD PRESSURE: 71 MMHG | BODY MASS INDEX: 28.63 KG/M2

## 2020-04-23 VITALS
DIASTOLIC BLOOD PRESSURE: 79 MMHG | BODY MASS INDEX: 28.63 KG/M2 | WEIGHT: 199.96 LBS | HEIGHT: 70 IN | SYSTOLIC BLOOD PRESSURE: 131 MMHG

## 2020-04-23 LAB
ATRIAL RATE: 57 BPM
CALCULATED P AXIS, ECG09: -10 DEGREES
CALCULATED R AXIS, ECG10: -7 DEGREES
CALCULATED T AXIS, ECG11: 2 DEGREES
CHOLEST SERPL-MCNC: 142 MG/DL
DIAGNOSIS, 93000: NORMAL
EST. AVERAGE GLUCOSE BLD GHB EST-MCNC: 123 MG/DL
HBA1C MFR BLD: 5.9 % (ref 4–5.6)
HDLC SERPL-MCNC: 40 MG/DL
HDLC SERPL: 3.6 {RATIO} (ref 0–5)
LDLC SERPL CALC-MCNC: 77 MG/DL (ref 0–100)
LIPID PROFILE,FLP: NORMAL
P-R INTERVAL, ECG05: 180 MS
Q-T INTERVAL, ECG07: 430 MS
QRS DURATION, ECG06: 70 MS
QTC CALCULATION (BEZET), ECG08: 418 MS
STRESS BASELINE DIAS BP: 79 MMHG
STRESS BASELINE HR: 53 BPM
STRESS BASELINE SYS BP: 131 MMHG
STRESS ESTIMATED WORKLOAD: 1 METS
STRESS EXERCISE DUR MIN: NORMAL
STRESS PEAK DIAS BP: 83 MMHG
STRESS PEAK SYS BP: 144 MMHG
STRESS PERCENT HR ACHIEVED: 49 %
STRESS POST PEAK HR: 75 BPM
STRESS RATE PRESSURE PRODUCT: NORMAL BPM*MMHG
STRESS ST DEPRESSION: 0 MM
STRESS ST ELEVATION: 0 MM
STRESS TARGET HR: 152 BPM
TRIGL SERPL-MCNC: 125 MG/DL (ref ?–150)
VENTRICULAR RATE, ECG03: 57 BPM
VLDLC SERPL CALC-MCNC: 25 MG/DL

## 2020-04-23 PROCEDURE — 97165 OT EVAL LOW COMPLEX 30 MIN: CPT

## 2020-04-23 PROCEDURE — 80061 LIPID PANEL: CPT

## 2020-04-23 PROCEDURE — 74011250637 HC RX REV CODE- 250/637: Performed by: INTERNAL MEDICINE

## 2020-04-23 PROCEDURE — 74011250637 HC RX REV CODE- 250/637: Performed by: NURSE PRACTITIONER

## 2020-04-23 PROCEDURE — 99218 HC RM OBSERVATION: CPT

## 2020-04-23 PROCEDURE — 36415 COLL VENOUS BLD VENIPUNCTURE: CPT

## 2020-04-23 PROCEDURE — 74011250636 HC RX REV CODE- 250/636: Performed by: INTERNAL MEDICINE

## 2020-04-23 PROCEDURE — 97116 GAIT TRAINING THERAPY: CPT

## 2020-04-23 PROCEDURE — A9500 TC99M SESTAMIBI: HCPCS

## 2020-04-23 PROCEDURE — 83036 HEMOGLOBIN GLYCOSYLATED A1C: CPT

## 2020-04-23 PROCEDURE — 97161 PT EVAL LOW COMPLEX 20 MIN: CPT

## 2020-04-23 RX ADMIN — CLOPIDOGREL BISULFATE 75 MG: 75 TABLET ORAL at 12:11

## 2020-04-23 RX ADMIN — Medication 10 ML: at 06:00

## 2020-04-23 RX ADMIN — REGADENOSON 0.4 MG: 0.08 INJECTION, SOLUTION INTRAVENOUS at 09:58

## 2020-04-23 RX ADMIN — ATORVASTATIN CALCIUM 40 MG: 20 TABLET, FILM COATED ORAL at 12:11

## 2020-04-23 RX ADMIN — ASPIRIN 81 MG 81 MG: 81 TABLET ORAL at 12:12

## 2020-04-23 NOTE — ROUTINE PROCESS
Bedside and Verbal shift change report given to [facility] (oncoming nurse) by Chapo Montes (offgoing nurse). Report included the following information Intake/Output, MAR, Accordion, Recent Results, Quality Measures and Dual Neuro Assessment.

## 2020-04-23 NOTE — PROGRESS NOTES
Reviewed all DC instructions. Opportunity for questions was offered. Reviewed all 3 medical appts that were on his AVS and also reviewed to take ASA daily. S/S of stroke/TIA reviewed. PIV DCd and transported out via [employer] with personal belongings. Stroke treatment brochure was provided to: patient. Rationale for acute work-up of symptoms explained. Possible treatments, such as tPA or intervention for ischemic strokes and the need for a quick work-up, have been reviewed.

## 2020-04-23 NOTE — PROGRESS NOTES
OCCUPATIONAL THERAPY EVALUATION/DISCHARGE  Patient: Coleman Ortiz (59 y.o. male)  Date: 4/23/2020  Primary Diagnosis: TIA (transient ischemic attack) [G45.9]  CVA (cerebral vascular accident) [facility]) [I63.9]       Precautions:  Fall    ASSESSMENT  Based on the objective data described below, the patient presents with near baseline ADL performance and mobility following admission for LE weakness causing GLF at home. MRI and CT of head negative for acute infarct. CT of spine shows stenosis at L2-L3 and L4-L5. Pt is received finishing PT session and is pleasant and agreeable to participate. He completed orthostatics with PT (see her note) and denies dizziness throughout session. Vision intact with ROM, coordination, and strength intact bilaterally in UEs. Pt is receptive to all education regarding activity pacing, BE FAST, and home safety. Pt is very active and demonstrates good insight for safety. No follow up OT needs indicated. Pt is cleared from OT for discharge. Current Level of Function (ADLs/self-care): independent    Functional Outcome Measure: The patient scored Total A-D  Total A-D (Motor Function): 66/66 on the Fugl-Dey Assessment which is indicative of no impairment in upper extremity functional status. Other factors to consider for discharge: lives with wife     PLAN :  Recommendation for discharge: (in order for the patient to meet his/her long term goals)  No skilled occupational therapy/ follow up rehabilitation needs identified at this time. This discharge recommendation:  Has not yet been discussed the attending provider and/or case management    IF patient discharges home will need the following DME:patient owns DME required for discharge       SUBJECTIVE:   Patient stated I'm eager to get back to running.     OBJECTIVE DATA SUMMARY:   HISTORY:   Past Medical History:   Diagnosis Date    Cancer [facility])    History reviewed. No pertinent surgical history.     Prior Level of Function/Environment/Context: active and independent  Expanded or extensive additional review of patient history:     Home Situation  Home Environment: Private residence  # Steps to Enter: 3  Rails to Enter: Yes  One/Two Story Residence: Two story  Living Alone: No  Support Systems: Spouse/Significant Other/Partner, Family member(s), Worship / beto community  Patient Expects to be Discharged to[de-identified] Private residence  Current DME Used/Available at Home: Tammi Kelli, rolling, Cane, straight  Tub or Shower Type: Shower    Hand dominance: Right    EXAMINATION OF PERFORMANCE DEFICITS:  Cognitive/Behavioral Status:  Neurologic State: Alert; Appropriate for age  Orientation Level: Oriented X4  Cognition: Follows commands; Appropriate decision making; Appropriate for age attention/concentration; Appropriate safety awareness  Perception: Appears intact  Perseveration: No perseveration noted  Safety/Judgement: Awareness of environment; Fall prevention;Good awareness of safety precautions; Home safety; Insight into deficits    Hearing: Auditory  Auditory Impairment: None    Vision/Perceptual:    Tracking: Able to track stimulus in all quadrants w/o difficulty    Visual Fields: (WFLs)  Diplopia: No    Acuity: Within Defined Limits;Able to read clock/calendar on wall without difficulty; Able to read employee name badge without difficulty; Impaired far vision    Corrective Lenses: Glasses    Range of Motion:  AROM: Within functional limits    Strength:  Strength: Within functional limits    Coordination:  Coordination: Within functional limits  Fine Motor Skills-Upper: Left Intact; Right Intact    Gross Motor Skills-Upper: Left Intact; Right Intact    Tone & Sensation:  Tone: Normal  Sensation: Intact    Balance:  Sitting: Intact  Standing: Intact    Functional Mobility and Transfers for ADLs:  Bed Mobility:  Supine to Sit: Independent  Sit to Supine: Independent    Transfers:  Sit to Stand: Independent  Stand to Sit: Independent  Bed to Chair: Independent  Toilet Transfer : Independent(infer based on observations)    ADL Assessment:  Feeding: Independent    Oral Facial Hygiene/Grooming: Independent    Bathing: Independent    Upper Body Dressing: Independent    Lower Body Dressing: Independent    Toileting: Independent    ADL Intervention and task modifications:  Cognitive Retraining  Safety/Judgement: Awareness of environment; Fall prevention;Good awareness of safety precautions; Home safety; Insight into deficits    Patient instructed and indicated understanding energy conservation techniques to increase independence and safety during ADLs. Functional Measure:  Fugl-Dey Assessment of Motor Recovery after Stroke:   Reflex Activity  Flexors/Biceps/Fingers: Can be elicited  Extensors/Triceps: Can be elicited  Reflex Subtotal: 4    Volitional Movement Within Synergies  Shoulder Retraction: Full  Shoulder Elevation: Full  Shoulder Abduction (90 degrees): Full  Shoulder External Rotation: Full  Elbow Flexion: Full  Forearm Supination: Full  Shoulder Adduction/Internal Rotation: Full  Elbow Extension: Full  Forearm Pronation: Full  Subtotal: 18    Volitional Movement Mixing Synergies  Hand to Lumbar Spine: Full  Shoulder Flexion (0-90 degrees): Full  Pronation-Supination: Full  Subtotal: 6    Volitional Movement With Little or No Synergy  Shoulder Abduction (0-90 degrees): Full  Shoulder Flexion ( degrees): Full  Pronation/Supination: Full  Subtotal : 6    Normal Reflex Activity  Biceps, Triceps, Finger Flexors:  Full  Subtotal : 2    Upper Extremity Total   Upper Extremity Total: 36    Wrist  Stability at 15 Degree Dorsiflexion: Full  Repeated Dorsiflexion/ Volar Flexion: Full  Stability at 15 Degree Dorsiflexion: Full  Repeated Dorsiflexion/ Volar Flexion: Full  Circumduction: Full  Wrist Total: 10    Hand  Mass Flexion: Full  Mass Extension: Full  Grasp A: Full  Grasp B: Full  Grasp C: Full  Grasp D: Full  Grasp E: Full  Hand Total: 14    Coordination/Speed  Tremor: None  Dysmetria: None  Time: <1s  Coordination/Speed Total : 6    Total A-D  Total A-D (Motor Function): 66/66     This is a reliable/valid measure of arm function after a neurological event. It has established value to characterize functional status and for measuring spontaneous and therapy-induced recovery; tests proximal and distal motor functions. Fugl-Dey Assessment  UE scores recorded between five and 30 days post neurologic event can be used to predict UE recovery at six months post neurologic event. Severe = 0-21 points   Moderately Severe = 22-33 points   Moderate = 34-47 points   Mild = 48-66 points  ROGELIO Lira, ELIJAH López, & KATHY Gonzáles (1992). Measurement of motor recovery after stroke: Outcome assessment and sample size requirements. Stroke, 23, pp. 5648-7660.   ------------------------------------------------------------------------------------------------------------------------------------------------------------------  MCID:  Stroke:   [place] et al, 2001; n = 171; mean age 79 (5) years; assessed within 16 (12) days of stroke, Acute Stroke)  FMA Motor Scores from Admission to Discharge   10 point increase in FMA Upper Extremity = 1.5 change in discharge FIM   10 point increase in FMA Lower Extremity = 1.9 change in discharge FIM  MDC:   Stroke:   Dafne Oquendo al, 2008, n = 14, mean age = 59.9 (14.6) years, assessed on average 14 (6.5) months post stroke, Chronic Stroke)   FMA = 5.2 points for the Upper Extremity portion of the assessment   Barthel Index:    Bathin  Bladder: 10  Bowels: 10  Groomin  Dressing: 10  Feeding: 10  Mobility: 15  Stairs: 10  Toilet Use: 10  Transfer (Bed to Chair and Back): 15  Total: 100/100        The Barthel ADL Index: Guidelines  1. The index should be used as a record of what a patient does, not as a record of what a patient could do.   2. The main aim is to establish degree of independence from any help, physical or verbal, however minor and for whatever reason. 3. The need for supervision renders the patient not independent. 4. A patient's performance should be established using the best available evidence. Asking the patient, friends/relatives and nurses are the usual sources, but direct observation and common sense are also important. However direct testing is not needed. 5. Usually the patient's performance over the preceding 24-48 hours is important, but occasionally longer periods will be relevant. 6. Middle categories imply that the patient supplies over 50 per cent of the effort. 7. Use of aids to be independent is allowed. Scott Smith., Barthel, DLuciaW. (3211). Functional evaluation: the Barthel Index. [address] [facility] (14)2. Britni Jones keven RIGO Winters, Anneliese Valdes., Manohar Santiago., [place], [address] (1999). Measuring the change indisability after inpatient rehabilitation; comparison of the responsiveness of the Barthel Index and Functional [place] Measure. Journal of Neurology, Neurosurgery, and Psychiatry, 66(4), 974-345. Sekou Montiel, N.J.A, ROSALIO Quesada, & Lana Wells, M.A. (2004.) Assessment of post-stroke quality of life in cost-effectiveness studies: The usefulness of the Barthel Index and the EuroQoL-5D.  Quality of Life Research, 15, 679-39     Occupational Therapy Evaluation Charge Determination   History Examination Decision-Making   LOW Complexity : Brief history review  LOW Complexity : 1-3 performance deficits relating to physical, cognitive , or psychosocial skils that result in activity limitations and / or participation restrictions  LOW Complexity : No comorbidities that affect functional and no verbal or physical assistance needed to complete eval tasks       Based on the above components, the patient evaluation is determined to be of the following complexity level: LOW   Pain Rating:  Pt reporting no pain this session    Activity Tolerance:   Good, tolerates ADLs without rest breaks and SpO2 stable on RA  Please refer to the flowsheet for vital signs taken during this treatment. After treatment patient left in no apparent distress:    Supine in bed and Call bell within reach    COMMUNICATION/EDUCATION:   The patients plan of care was discussed with: Physical therapist and Registered nurse. Patient and/or family was verbally educated on the BE FAST acronym for signs/symptoms of CVA and TIA. Informed patient to refer to the Stroke Binder for further BE FAST information. All questions answered with patient indicating good understanding.      Thank you for this referral.  Lucina Desir OT  Time Calculation: 14 mins

## 2020-04-23 NOTE — PROGRESS NOTES
Occupational Therapy:  04/23/20    Orders received, chart reviewed and patient evaluated by occupational therapy. Pending progression with skilled acute occupational therapy, recommend:  No skilled occupational therapy/ follow up rehabilitation needs identified at this time. Recommend with nursing patient to complete as able in order to maintain strength, endurance and independence: OOB to chair 3x/day with 1 person SBA and functional mobility to the bathroom. Thank you for your assistance. Full evaluation to follow.      Thank you,  Zoe Gayle, OTR/L

## 2020-04-23 NOTE — DISCHARGE INSTRUCTIONS
HOSPITALIST DISCHARGE INSTRUCTIONS  NAME: Mac Ma   :  1950   MRN:  [id number]     Date/Time:  2020 12:30 PM    ADMIT DATE: 2020     DISCHARGE DATE: 2020     ADMITTING DIAGNOSIS:  Possible stroke    DISCHARGE DIAGNOSIS:  As above    MEDICATIONS:    · It is important that you take the medication exactly as they are prescribed. · Keep your medication in the bottles provided by the pharmacist and keep a list of the medication names, dosages, and times to be taken in your wallet. · Do not take other medications without consulting your doctor. Pain Management: per above medications         Recommended diet:  Resume previous diet    Recommended activity: Activity as tolerated    If you experience any of the following symptoms then please call your primary care physician or return to the emergency room if you cannot get hold of your doctor:  Fever, chills, nausea, vomiting, diarrhea, change in mentation, falling, bleeding, shortness of breath           Information obtained by :  I understand that if any problems occur once I am at home I am to contact my physician. I understand and acknowledge receipt of the instructions indicated above.                                                                                                                                            Physician's or R.N.'s Signature                                                                  Date/Time                                                                                                                                              Patient or Representative Signature                                                          Date/Time

## 2020-04-23 NOTE — PROGRESS NOTES
Occupational Therapy:  04/23/20    Orders received and appreciated, chart reviewed, spoke to RN. Pt is currently KATERINA. Will follow up for OT evaluation once pt returns.     Thank you,  Mark Hendricks, OTR/L

## 2020-04-23 NOTE — PROGRESS NOTES
PHYSICAL THERAPY EVALUATION/DISCHARGE  Patient: Donna Carson (82 y.o. male)  Date: 4/23/2020  Primary Diagnosis: TIA (transient ischemic attack) [G45.9]  CVA (cerebral vascular accident) [facility]) [I63.9]       Precautions:   Fall      ASSESSMENT  Based on the objective data described below, the patient presents with baseline functional strength, ROM, transfers, balance and gait following admission for possible CVA. Patient had GLF at home while returning from restroom due to left leg weakness and numbness, now resolved. CT and MRI of head negative for acute infarct, though question of age indeterminate left external capsule infarct. CT of spine showed multilevel moderate stenosis at L2-L3 and L4-L5. PT assessed patient for orthostatic blood pressures and he is stable (see doc flow sheets). Educated patient regarding [facility] signs and symptoms of stroke and provided him with pamphlet for home use. Patient demonstrated high score on WOODALL BALANCE test and is safe for discharge from a PT standpoint. Patient expressed that he plans to follow up with MD regarding back issues. Functional Outcome Measure: The patient scored 54/56 on the WOODALL outcome measure which is indicative of safety for independent ambulation. Other factors to consider for discharge: none     Further skilled acute physical therapy is not indicated at this time. PLAN :  Recommendation for discharge: (in order for the patient to meet his/her long term goals)  No skilled physical therapy/ follow up rehabilitation needs identified at this time. This discharge recommendation:  Has been made in collaboration with the attending provider and/or case management    IF patient discharges home will need the following DME: none       SUBJECTIVE:   Patient stated I am doing better; I have had issues with that left leg off and on for a while now.     OBJECTIVE DATA SUMMARY:   HISTORY:    Past Medical History:   Diagnosis Date    Cancer ([facility] [address] History reviewed. No pertinent surgical history. Prior level of function: independent; works as a   Personal factors and/or comorbidities impacting plan of care: none    [address]: Private residence  # Steps to Enter: 3  Rails to [employer]: Yes  One/Two Story Residence: Two story  Living Alone: No  Support Systems: Spouse/Significant Other/Partner, Family member(s), [employer] / beto community  Patient Expects to be Discharged to[de-identified] Private residence  Current DME Used/Available at Home: pj Galvan, [address], [place], straight    EXAMINATION/PRESENTATION/DECISION MAKING:   Critical Behavior:  Neurologic State: Alert, Appropriate for age  Orientation Level: Appropriate for age, Oriented X4  Cognition: Follows commands  Safety/Judgement: Awareness of environment  Hearing: Auditory  Auditory Impairment: None  Skin:  Not observed  Range Of Motion:  AROM: Within functional limits                       Strength:    Strength:  Within functional limits                    Tone & Sensation:   Tone: Normal              Sensation: Intact               Coordination:   WFL  Vision:    WFL  Functional Mobility:  Bed Mobility:     Supine to Sit: Independent  Sit to Supine: Independent     Transfers:  Sit to Stand: Independent  Stand to Sit: Independent        Bed to Chair: Independent              Balance:   Sitting: Intact  Standing: Intact  Ambulation/Gait Training:  Distance (ft): 100 Feet (ft)  Assistive Device: Gait belt  Ambulation - Level of Assistance: Stand-by assistance                                                Stairs:  Number of Stairs Trained: 4  Stairs - Level of Assistance: Contact guard assistance   Rail Use: None        Functional Measure:  [place] Balance Test:    Sitting to Standin  Standing Unsupported: 4  Sitting with Back Unsupported: 4  Standing to Sittin  Transfers: 4  Standing Unsupported with Eyes Closed: 4  Standing Unsupported with Feet Together: 4  Reach Forward with Outstretched Arm: 4   Object: 4  Turn to Look Over Shoulders: 4  Turn 360 Degrees: 4  Alternate Foot on Step/Stool: 3  Standing Unsupported One Foot in Front: 4  Stand on One Leg: 3  Total: 54/56         56=Maximum possible score;   0-20=High fall risk  21-40=Moderate fall risk   41-56=Low fall risk                  Physical Therapy Evaluation Charge Determination   History Examination Presentation Decision-Making   LOW Complexity : Zero comorbidities / personal factors that will impact the outcome / POC LOW Complexity : 1-2 Standardized tests and measures addressing body structure, function, activity limitation and / or participation in recreation  LOW Complexity : Stable, uncomplicated  Other outcome measures [place]  LOW       Based on the above components, the patient evaluation is determined to be of the following complexity level: LOW     Pain Rating:  None at this time    Activity Tolerance:   Good  Please refer to the flowsheet for vital signs taken during this treatment. After treatment patient left in no apparent distress:   Supine in bed working with OT    COMMUNICATION/EDUCATION:   The patients plan of care was discussed with: Occupational therapist and Registered nurse. Fall prevention education was provided and the patient/caregiver indicated understanding. and Patient/family agree to work toward stated goals and plan of care.     Thank you for this referral.  Julio Cesar Vicente, PT   Time Calculation: 24 mins

## 2020-04-23 NOTE — PROGRESS NOTES
CM Note:  Pt d/c'd to home today. He is to be transported by his family. No CM needs.   DOMINIC Delgadillo

## 2020-04-23 NOTE — DISCHARGE SUMMARY
Physician Discharge Summary     Patient ID:  Marilia Quijano  [id number]  20 y.o.  1950    Admit date: 4/22/2020    Discharge date and time: 4/23/2020    Admission Diagnoses: TIA (transient ischemic attack) [G45.9]  CVA (cerebral vascular accident) [facility]) [I63.9]    Discharge Diagnoses: Active Problems:    CVA (cerebral vascular accident) ([facility] [address] (4/22/2020)      Hyperlipidemia (4/22/2020)      Prostate cancer ([facility] [address] (4/22/2020)      Chest pain (4/22/2020)           Hospital Course:   CVA (cerebral vascular accident): age indeterminate left external capsule infarct noted on CT head. MRI negative. CTA head and neck notes no vessel occlusions. Reviewed by neuro who feels this is not consistent with a stroke       Hyperlipidemia: resume lipitor.        Prostate cancer: outpt follow up       Chest pain: stress test normal      HTN; BP mildly elevated. Monitor trend. May need to start treatment. PCP: None     Consults: Neurology    Condition of patient at discharge: improved    Discharge Exam:  No further symptoms.  Pt feels ready to go home    Physical Exam:    Gen: Well-developed, well-nourished, in no acute distress  HEENT:  Pink conjunctivae, PERRL, hearing intact to voice, moist mucous membranes  Neck: Supple, without masses, thyroid non-tender  Resp: No accessory muscle use, clear breath sounds without wheezes rales or rhonchi  Card: No murmurs, normal S1, S2 without thrills, bruits or peripheral edema  Abd:  Soft, non-tender, non-distended, normoactive bowel sounds are present, no palpable organomegaly and no detectable hernias  Lymph:  No cervical or inguinal adenopathy  Musc: No cyanosis or clubbing  Skin: No rashes or ulcers, skin turgor is good  Neuro:  Cranial nerves are grossly intact, no focal motor weakness, follows commands appropriately  Psych:  Good insight, oriented to person, place and time, alert      Disposition: home    Patient Instructions:   Current Discharge Medication List      CONTINUE these medications which have NOT CHANGED    Details   aspirin 81 mg chewable tablet Take 81 mg by mouth daily. fenofibrate (LOFIBRA) 160 mg tablet Take 160 mg by mouth daily. tadalafiL (Cialis) 5 mg tablet Take 5 mg by mouth.            Activity: Activity as tolerated  Diet: Resume previous diet  Wound Care: None needed      Approximate time spent in patient care on day of discharge: 33 minutes    Signed:  Lennie Carmona MD  4/23/2020  12:30 PM

## 2020-04-24 ENCOUNTER — PATIENT OUTREACH (OUTPATIENT)
Dept: CARDIOLOGY CLINIC | Age: 70
End: 2020-04-24

## 2020-04-24 NOTE — PROGRESS NOTES
Patient contacted regarding recent discharge and COVID-19 risk   Care Transition Nurse/ Ambulatory Care Manager contacted the patient by telephone to perform post discharge assessment. Verified name and  with spouse, Laurie Sosa as identifiers. Note- she had living related kidney transplant in , pancreas transplant - [facility]. DEBORAH tested positive for COVID but they have not been exposed to him. Patient has following risk factors of: no known risk factors. CTN/ACM reviewed discharge instructions, medical action plan and red flags related to discharge diagnosis. Reviewed and educated them on any new and changed medications related to discharge diagnosis. Advised obtaining a 90-day supply of all daily and as-needed medications. Education provided regarding infection prevention, and signs and symptoms of COVID-19 and when to seek medical attention with family who verbalized understanding. Discussed exposure protocols and quarantine from [address] you at higher risk for severe illness  and given an opportunity for questions and concerns. The family agrees to contact the COVID-19 hotline [phone] or PCP office for questions related to their healthcare. CTN/ACM provided contact information for future reference. From CDC: Are you at higher risk for severe illness?  Wash your hands often.  Avoid close contact (6 feet, which is about two arm lengths) with people who are sick.  Put distance between yourself and other people if COVID-19 is spreading in your community.  Clean and disinfect frequently touched surfaces.  Avoid all cruise travel and non-essential air travel.  Call your healthcare professional if you have concerns about COVID-19 and your underlying condition or if you are sick.     For more information on steps you can take to protect yourself, see CDC's How to Protect Yourself      Patient/family/caregiver given information for Jamar Trammell and agrees to enroll yes  Patient's preferred e-mail:  [email or web address]  Patient's preferred phone number: [phone]  Based on Loop alert triggers, patient will be contacted by nurse care manager for worsening symptoms. Pt will be further monitored by COVID Loop Team based on severity of symptoms and risk factors.

## 2020-06-01 ENCOUNTER — HOSPITAL ENCOUNTER (OUTPATIENT)
Dept: MRI IMAGING | Age: 70
Discharge: HOME | End: 2020-06-01
Attending: NEUROLOGICAL SURGERY
Payer: COMMERCIAL

## 2020-06-01 DIAGNOSIS — M48.061 LUMBAR STENOSIS: ICD-10-CM

## 2020-06-01 PROCEDURE — 72148 MRI LUMBAR SPINE W/O DYE: CPT

## 2022-03-19 PROBLEM — I63.9 CVA (CEREBRAL VASCULAR ACCIDENT) (HCC): Status: ACTIVE | Noted: 2020-04-22

## 2022-03-19 PROBLEM — C61 PROSTATE CANCER (HCC): Status: ACTIVE | Noted: 2020-04-22

## 2022-03-19 PROBLEM — R07.9 CHEST PAIN: Status: ACTIVE | Noted: 2020-04-22

## 2022-03-19 PROBLEM — E78.5 HYPERLIPIDEMIA: Status: ACTIVE | Noted: 2020-04-22

## 2025-04-09 ENCOUNTER — CLINICAL DOCUMENTATION (OUTPATIENT)
Facility: HOSPITAL | Age: 75
End: 2025-04-09

## 2025-04-09 ENCOUNTER — HOSPITAL ENCOUNTER (OUTPATIENT)
Facility: HOSPITAL | Age: 75
Discharge: HOME | End: 2025-04-12

## 2025-04-09 VITALS
DIASTOLIC BLOOD PRESSURE: 76 MMHG | BODY MASS INDEX: 28.84 KG/M2 | HEART RATE: 54 BPM | OXYGEN SATURATION: 97 % | SYSTOLIC BLOOD PRESSURE: 132 MMHG | WEIGHT: 206 LBS | RESPIRATION RATE: 16 BRPM | HEIGHT: 71 IN

## 2025-04-09 DIAGNOSIS — C61 MALIGNANT NEOPLASM OF PROSTATE (HCC): Primary | ICD-10-CM

## 2025-04-09 RX ORDER — M-VIT,TX,IRON,MINS/CALC/FOLIC 27MG-0.4MG
1 TABLET ORAL DAILY
COMMUNITY

## 2025-04-09 RX ORDER — ATORVASTATIN CALCIUM 40 MG/1
TABLET, FILM COATED ORAL
COMMUNITY
Start: 2025-03-28

## 2025-04-09 RX ORDER — FENOFIBRATE 160 MG/1
160 TABLET ORAL DAILY
COMMUNITY

## 2025-04-09 ASSESSMENT — PAIN SCALES - GENERAL: PAINLEVEL_OUTOF10: 3

## 2025-04-09 ASSESSMENT — PAIN DESCRIPTION - ORIENTATION: ORIENTATION: RIGHT

## 2025-04-09 ASSESSMENT — PAIN DESCRIPTION - LOCATION: LOCATION: GROIN

## 2025-04-09 NOTE — PROGRESS NOTES
[facility] [place] [facility]  [facility]      RADIATION ONCOLOGY INITIAL CONSULTATION NOTE      Encounter Date: 04/09/25  Patient Name: Schuyler Mathew  YOB: 1950  Medical Record Number: [id number]  Referring Physician: Rodri Elliott MD  Primary Care Provider: Dada Aguillon MD      DIAGNOSIS:       ICD-10-CM    1. Malignant neoplasm of prostate (HCC)  C61         STAGING:    Cancer Staging   Malignant neoplasm of prostate (HCC)  Staging form: Prostate, AJCC 8th Edition  - Clinical stage from 3/24/2025: Stage IIB (cT1c, cN0, cM0, PSA: 6.9, Grade Group: 2) - Signed by Daniel Bolanos MD on 4/9/2025  - Clinical stage from 2/26/2018: Stage I (cT1c, cN0, cM0, PSA: 3.5, Grade Group: 1) - Signed by Daniel Bolanos MD on 4/9/2025  AJCC Staging has been reviewed      CHIEF COMPLAINT:   Prostate adenocarcinoma      ASSESSMENT:   73 yo M with favorable intermediate risk prostate cancer, cT1c, PSA 6.87, Grant 3+4=7 in 1 cores (3/13 total cores involved).    Patient with a history of low risk/favorable intermediate risk prostate cancer (cT1c, iPSA 3.5, Soham 3+3=6 in 1/12 cores although second opinion read said Soham 3+4=7 in 1/12 cores) diagnosed with TRUS biopsy on 2/26/18 and went on active surveillance.  Has had multiple pelvic MRIs while on surveillance, all showing a stable P3-4 lesion in left lateral base without EPE/SVI.  Has also had two additional biopsies, with each showing Grant 3+4=7 in a few cores and Grant 6 in a few cores.  PSA has remained fairly stable until recently when it increased to 8.03 (10/2024), although this was most likely secondary to additional inflammation as currently it is trending back down at 6.87. He had an updated biopsy on 3/24/25 showing stable Grant 3+4=7 disease in 1 core and Soham 6 in 3 cores.    The patient has continued to have low volume, favorable intermediate risk disease with no significant change on

## 2025-04-09 NOTE — PROGRESS NOTES
NCCN Distress Thermometer    [facility]    Date Screening Completed: 4/9/25    Screening Declined:  [] Yes    Number that best describes how much distress you've experienced in the past week, including today?  0 [] - No distress 1 []      2 [x]      3 []      4 []       5 []       6 []      7 []      8 []      9 []       10 [] - Extreme distress    PROBLEM LIST  Have you had concerns about any of the items below in the past week, including today?      Physical Concerns Practical Concerns   [] Pain [] Taking care of myself    [] Sleep [] Taking care of others    [] Fatigue [] Safety   [] Tobacco use  [] Work   [] Substance use  [] School   [] Memory or concentration [] Housing/Utilities   [x] Sexual health [] Finances   [] Changes in eating  [] Insurance   [] Loss or change of physical abilities  [] Transportation    []    Emotional Concerns [] Having enough food   [] Worry or anxiety [] Access to medicine   [] Sadness or depression [x] Treatment decisions   [] Loss of interest or enjoyment     [] Grief or loss  Spiritual or Voodoo Concerns   [] Fear [] Sense of meaning or purpose   [] Loneliness  [] Changes in fritz or beliefs   [] Anger [] Death, dying, or afterlife   [] Changes in appearance [] Conflict between beliefs and cancer treatments    [] Feelings of worthlessness or being a burden [] Relationship with the sacred    [] Ritual or dietary needs    Social Concerns     [] Relationship with spouse or partner     [] Relationship with children    [] Relationship with family members     [] Relationship with friends or coworkers     [] Communication with health care team     [] Ability to have children     [] Prejudice or discrimination        Other Concerns:

## 2025-04-09 NOTE — PROGRESS NOTES
Education was provided to the patient in the form of the following printed MELVIN booklet or booklets: Radiation Therapy for Prostate Cancer    Total time spent on education with patient: minutes: 5-10 minutes